# Patient Record
Sex: FEMALE | Race: WHITE | Employment: UNEMPLOYED | URBAN - METROPOLITAN AREA
[De-identification: names, ages, dates, MRNs, and addresses within clinical notes are randomized per-mention and may not be internally consistent; named-entity substitution may affect disease eponyms.]

---

## 2019-04-11 ENCOUNTER — HOSPITAL ENCOUNTER (INPATIENT)
Age: 29
LOS: 1 days | Discharge: HOME | DRG: 885 | End: 2019-04-12
Attending: EMERGENCY MEDICINE | Admitting: PSYCHIATRY & NEUROLOGY
Payer: SELF-PAY

## 2019-04-11 DIAGNOSIS — F32.2 SEVERE DEPRESSION (HCC): Primary | ICD-10-CM

## 2019-04-11 PROBLEM — F32.9 MAJOR DEPRESSIVE DISORDER: Status: ACTIVE | Noted: 2019-04-11

## 2019-04-11 LAB
ALBUMIN SERPL-MCNC: 3.4 G/DL (ref 3.5–5)
ALBUMIN/GLOB SERPL: 0.9 {RATIO} (ref 1.1–2.2)
ALP SERPL-CCNC: 107 U/L (ref 45–117)
ALT SERPL-CCNC: 23 U/L (ref 12–78)
AMPHET UR QL SCN: NEGATIVE
ANION GAP SERPL CALC-SCNC: 7 MMOL/L (ref 5–15)
APPEARANCE UR: ABNORMAL
AST SERPL-CCNC: 19 U/L (ref 15–37)
BACTERIA URNS QL MICRO: NEGATIVE /HPF
BARBITURATES UR QL SCN: NEGATIVE
BASOPHILS # BLD: 0.1 K/UL (ref 0–0.1)
BASOPHILS NFR BLD: 1 % (ref 0–1)
BENZODIAZ UR QL: NEGATIVE
BILIRUB SERPL-MCNC: 0.2 MG/DL (ref 0.2–1)
BILIRUB UR QL: NEGATIVE
BUN SERPL-MCNC: 12 MG/DL (ref 6–20)
BUN/CREAT SERPL: 13 (ref 12–20)
CALCIUM SERPL-MCNC: 8.8 MG/DL (ref 8.5–10.1)
CANNABINOIDS UR QL SCN: NEGATIVE
CHLORIDE SERPL-SCNC: 108 MMOL/L (ref 97–108)
CO2 SERPL-SCNC: 25 MMOL/L (ref 21–32)
COCAINE UR QL SCN: NEGATIVE
COLOR UR: ABNORMAL
CREAT SERPL-MCNC: 0.9 MG/DL (ref 0.55–1.02)
DIFFERENTIAL METHOD BLD: ABNORMAL
DRUG SCRN COMMENT,DRGCM: NORMAL
EOSINOPHIL # BLD: 0.5 K/UL (ref 0–0.4)
EOSINOPHIL NFR BLD: 5 % (ref 0–7)
EPITH CASTS URNS QL MICRO: ABNORMAL /LPF
ERYTHROCYTE [DISTWIDTH] IN BLOOD BY AUTOMATED COUNT: 13.1 % (ref 11.5–14.5)
ETHANOL SERPL-MCNC: <10 MG/DL
GLOBULIN SER CALC-MCNC: 3.8 G/DL (ref 2–4)
GLUCOSE SERPL-MCNC: 95 MG/DL (ref 65–100)
GLUCOSE UR STRIP.AUTO-MCNC: NEGATIVE MG/DL
HCG UR QL: NEGATIVE
HCT VFR BLD AUTO: 39.4 % (ref 35–47)
HGB BLD-MCNC: 13.4 G/DL (ref 11.5–16)
HGB UR QL STRIP: ABNORMAL
HYALINE CASTS URNS QL MICRO: ABNORMAL /LPF (ref 0–5)
IMM GRANULOCYTES # BLD AUTO: 0 K/UL (ref 0–0.04)
IMM GRANULOCYTES NFR BLD AUTO: 0 % (ref 0–0.5)
KETONES UR QL STRIP.AUTO: NEGATIVE MG/DL
LEUKOCYTE ESTERASE UR QL STRIP.AUTO: ABNORMAL
LYMPHOCYTES # BLD: 2.8 K/UL (ref 0.8–3.5)
LYMPHOCYTES NFR BLD: 28 % (ref 12–49)
MCH RBC QN AUTO: 30 PG (ref 26–34)
MCHC RBC AUTO-ENTMCNC: 34 G/DL (ref 30–36.5)
MCV RBC AUTO: 88.3 FL (ref 80–99)
METHADONE UR QL: NEGATIVE
MONOCYTES # BLD: 0.6 K/UL (ref 0–1)
MONOCYTES NFR BLD: 6 % (ref 5–13)
NEUTS SEG # BLD: 6.1 K/UL (ref 1.8–8)
NEUTS SEG NFR BLD: 60 % (ref 32–75)
NITRITE UR QL STRIP.AUTO: NEGATIVE
NRBC # BLD: 0 K/UL (ref 0–0.01)
NRBC BLD-RTO: 0 PER 100 WBC
OPIATES UR QL: NEGATIVE
PCP UR QL: NEGATIVE
PH UR STRIP: 6.5 [PH] (ref 5–8)
PLATELET # BLD AUTO: 214 K/UL (ref 150–400)
PMV BLD AUTO: 11.6 FL (ref 8.9–12.9)
POTASSIUM SERPL-SCNC: 4 MMOL/L (ref 3.5–5.1)
PROT SERPL-MCNC: 7.2 G/DL (ref 6.4–8.2)
PROT UR STRIP-MCNC: NEGATIVE MG/DL
RBC # BLD AUTO: 4.46 M/UL (ref 3.8–5.2)
RBC #/AREA URNS HPF: ABNORMAL /HPF (ref 0–5)
SODIUM SERPL-SCNC: 140 MMOL/L (ref 136–145)
SP GR UR REFRACTOMETRY: 1.03 (ref 1–1.03)
TSH SERPL DL<=0.05 MIU/L-ACNC: 1.54 UIU/ML (ref 0.36–3.74)
UROBILINOGEN UR QL STRIP.AUTO: 0.2 EU/DL (ref 0.2–1)
WBC # BLD AUTO: 10 K/UL (ref 3.6–11)
WBC URNS QL MICRO: ABNORMAL /HPF (ref 0–4)

## 2019-04-11 PROCEDURE — 81001 URINALYSIS AUTO W/SCOPE: CPT

## 2019-04-11 PROCEDURE — 36415 COLL VENOUS BLD VENIPUNCTURE: CPT

## 2019-04-11 PROCEDURE — 80053 COMPREHEN METABOLIC PANEL: CPT

## 2019-04-11 PROCEDURE — 99284 EMERGENCY DEPT VISIT MOD MDM: CPT

## 2019-04-11 PROCEDURE — 84443 ASSAY THYROID STIM HORMONE: CPT

## 2019-04-11 PROCEDURE — 81025 URINE PREGNANCY TEST: CPT

## 2019-04-11 PROCEDURE — 90791 PSYCH DIAGNOSTIC EVALUATION: CPT

## 2019-04-11 PROCEDURE — 65220000003 HC RM SEMIPRIVATE PSYCH

## 2019-04-11 PROCEDURE — 80307 DRUG TEST PRSMV CHEM ANLYZR: CPT

## 2019-04-11 PROCEDURE — 85025 COMPLETE CBC W/AUTO DIFF WBC: CPT

## 2019-04-11 RX ORDER — ACETAMINOPHEN 325 MG/1
650 TABLET ORAL
Status: DISCONTINUED | OUTPATIENT
Start: 2019-04-11 | End: 2019-04-12 | Stop reason: HOSPADM

## 2019-04-11 RX ORDER — OLANZAPINE 5 MG/1
5 TABLET ORAL
Status: DISCONTINUED | OUTPATIENT
Start: 2019-04-11 | End: 2019-04-12 | Stop reason: HOSPADM

## 2019-04-11 RX ORDER — ADHESIVE BANDAGE
30 BANDAGE TOPICAL DAILY PRN
Status: DISCONTINUED | OUTPATIENT
Start: 2019-04-11 | End: 2019-04-12 | Stop reason: HOSPADM

## 2019-04-11 RX ORDER — BENZTROPINE MESYLATE 2 MG/1
2 TABLET ORAL
Status: DISCONTINUED | OUTPATIENT
Start: 2019-04-11 | End: 2019-04-12 | Stop reason: HOSPADM

## 2019-04-11 RX ORDER — BENZTROPINE MESYLATE 1 MG/ML
2 INJECTION INTRAMUSCULAR; INTRAVENOUS
Status: DISCONTINUED | OUTPATIENT
Start: 2019-04-11 | End: 2019-04-12 | Stop reason: HOSPADM

## 2019-04-11 RX ORDER — TRAZODONE HYDROCHLORIDE 50 MG/1
50 TABLET ORAL
Status: DISCONTINUED | OUTPATIENT
Start: 2019-04-11 | End: 2019-04-12 | Stop reason: HOSPADM

## 2019-04-11 RX ORDER — IBUPROFEN 400 MG/1
400 TABLET ORAL
Status: DISCONTINUED | OUTPATIENT
Start: 2019-04-11 | End: 2019-04-12 | Stop reason: HOSPADM

## 2019-04-11 RX ORDER — BISMUTH SUBSALICYLATE 262 MG
1 TABLET,CHEWABLE ORAL DAILY
COMMUNITY
End: 2020-10-01

## 2019-04-11 RX ORDER — IBUPROFEN 200 MG
1 TABLET ORAL
Status: DISCONTINUED | OUTPATIENT
Start: 2019-04-11 | End: 2019-04-12 | Stop reason: HOSPADM

## 2019-04-11 RX ORDER — HYDROXYZINE 50 MG/1
50 TABLET, FILM COATED ORAL
Status: DISCONTINUED | OUTPATIENT
Start: 2019-04-11 | End: 2019-04-12 | Stop reason: HOSPADM

## 2019-04-11 NOTE — ROUTINE PROCESS
TRANSFER - OUT REPORT: 
 
Verbal report given to DOYLE Wang(name) on Rosa Tamez  being transferred to [facility](unit) for routine progression of care Report consisted of patients Situation, Background, Assessment and  
Recommendations(SBAR). Information from the following report(s) SBAR and Recent Results was reviewed with the receiving nurse. Opportunity for questions and clarification was provided. Patient transported with: 
RN Update: Patient ambulatory, VSS, medically cleared and in NAD at time of transfer to [facility].

## 2019-04-11 NOTE — ED TRIAGE NOTES
1543: Patient arrives for depression. Patient states that she has been feeling, \"tired lately, fatigue, and I cant sleep, Mery gained like 15 lbs over the past 1.5 months. \" Decrease in \"caring for home and taking care of the kids. \"  
 
 
Denies SI/HI. 1551: BSMART with patient in RRB.

## 2019-04-11 NOTE — ED PROVIDER NOTES
HPI Pt expresses sadness and feeling over whelmed; very teary; accompanied by her . She reports a headache, uncontrollable crying spells and recent unexplained weight gain. Denies fever, cough, cold symptoms, neck pain, visual changes, focal weakness or rash. Denies any  difficulty breathing, difficulty swallowing, SOB or chest pain. Denies any nausea, vomiting or diarrhea. Pt. Reports that she has not had any medications today prior to arrival. 
Pt has 3 children (ages 10, 3, and 2 ) with multiple medical issues. History reviewed. No pertinent past medical history. History reviewed. No pertinent surgical history. History reviewed. No pertinent family history. Social History Socioeconomic History  Marital status: Not on file Spouse name: Not on file  Number of children: Not on file  Years of education: Not on file  Highest education level: Not on file Occupational History  Not on file Social Needs  Financial resource strain: Not on file  Food insecurity:  
  Worry: Not on file Inability: Not on file  Transportation needs:  
  Medical: Not on file Non-medical: Not on file Tobacco Use  Smoking status: Never Smoker  Smokeless tobacco: Never Used Substance and Sexual Activity  Alcohol use: Never Frequency: Never  Drug use: Never  Sexual activity: Not on file Lifestyle  Physical activity:  
  Days per week: Not on file Minutes per session: Not on file  Stress: Not on file Relationships  Social connections:  
  Talks on phone: Not on file Gets together: Not on file Attends Zoroastrian service: Not on file Active member of club or organization: Not on file Attends meetings of clubs or organizations: Not on file Relationship status: Not on file  Intimate partner violence:  
  Fear of current or ex partner: Not on file Emotionally abused: Not on file Physically abused: Not on file Forced sexual activity: Not on file Other Topics Concern  Not on file Social History Narrative  Not on file ALLERGIES: Patient has no known allergies. Review of Systems Constitutional: Positive for activity change, appetite change and unexpected weight change. HENT: Negative for trouble swallowing. Respiratory: Negative for cough and shortness of breath. Cardiovascular: Negative for chest pain, palpitations and leg swelling. Gastrointestinal: Negative for abdominal pain, diarrhea, nausea and vomiting. Genitourinary: Negative for dysuria. Skin: Negative for rash. Neurological: Positive for headaches. Psychiatric/Behavioral: Positive for decreased concentration, dysphoric mood and sleep disturbance. The patient is nervous/anxious. All other systems reviewed and are negative. Vitals:  
 04/11/19 1508 04/11/19 1546 BP:  127/85 Pulse: 72 90 Resp:  15 SpO2: 100% 98% Physical Exam  
Constitutional:  
Obese white female; non smoker; ; mother of 3 HENT:  
Right Ear: External ear normal.  
Left Ear: External ear normal.  
Nose: Nose normal.  
Mouth/Throat: Oropharynx is clear and moist.  
Neck: Normal range of motion. Neck supple. Cardiovascular: Normal rate and regular rhythm. Pulmonary/Chest: Effort normal and breath sounds normal.  
Abdominal: Soft. Bowel sounds are normal.  
Musculoskeletal: Normal range of motion. Lymphadenopathy:  
  She has no cervical adenopathy. Neurological: She is alert. Skin: Skin is warm and dry. No rash noted. Psychiatric:  
Appears depressed, teary; tired Nursing note and vitals reviewed. MDM Procedures Bsmart consult and evaluation; Dr. Silverio Mahajan will admit. 5:55 PM 
Patient's results and plan of care have been reviewed with the pt and her .   Patient and/or family have verbally conveyed their understanding and agreement of the patient's signs, symptoms, diagnosis, treatment and prognosis and additionally agree to be admitted. Salma Hensley NP Discussed plan of care with Dr. Joshua Castro.  Salma Hensley NP

## 2019-04-11 NOTE — BSMART NOTE
Comprehensive Assessment Form Part 1 Section I - Disposition Axis I - Major Depression, Single Episode, Severe Axis II - Deffered Axis III - History reviewed. No pertinent past medical history. Axis IV - stressors with children [place] V - 45-50 The Medical Doctor to Psychiatrist conference was not completed. The Medical Doctor is in agreement with Psychiatrist disposition because of (reason) they agree with the dispotition. The plan is admission to the behavioral health unit. The on-call Psychiatrist consulted was NP Marina Hall. The admitting Psychiatrist will be Dr. Marcos Smith. The admitting Diagnosis is Major Depression. The Payor source is Self Pay. Section II - Integrated Summary Summary:  Writer met with this patient face to face at [facility] emergency department. She appeared very lethargic with swollen and slightly open eyes. She says that she is sleeping all day and all night and has gained 15 pounds in the last month and a has little to no energy to do the the things she knows that she needs to do. She denied suicidal and homicidal ideation. However, she does have that thoughts \"if I was the sick one, then things would get better\". Her spouse expressed concerns that this is highly unlike her and is very concerned about her lethargy. Writer called and spoke to on call nurse practitioner Marina Hall who instructed writer to admit this patient to the behavioral health unit citing concerns for psychomotor retardation. Writer discussed this with the patient and she is voluntary for admission. The patient has demonstrated mental capacity to provide informed consent. The information is given by the patient and spouse/SO. The Chief Complaint is depression. The Precipitant Factors are see axis IV. Previous Hospitalizations: none The patient has not previously been in restraints. Current Psychiatrist and/or  is none. Lethality Assessment: The potential for suicide noted by the following: severe depression symptoms. The potential for homicide is not noted. The patient has not been a perpetrator of sexual or physical abuse. There are not pending charges. The patient is not felt to be at risk for self harm or harm to others. The attending nurse was advised that security has not been notified. Section III - Psychosocial 
The patient's overall mood and attitude is depressed. Feelings of helplessness and hopelessness are not observed. Generalized anxiety is not observed. Panic is not observed. Phobias are not observed. Obsessive compulsive tendencies are not observed. Section IV - Mental Status Exam 
The patient's appearance shows no evidence of impairment. The patient's behavior is guarded, shows retardation and shows poor eye contact. The patient is oriented to time, place, person and situation. The patient's speech is slowed. The patient's mood is depressed, is withdrawn and displays anhedonia. The range of affect is flat. The patient's thought content demonstrates no evidence of impairment. The thought process is blocking. The patient's perception shows no evidence of impairment. The patient's memory shows no evidence of impairment. The patient's appetite is increased and shows signs of weight gain. The patient's sleep has evidence of hypersomnia. The patient shows no insight. The patient's judgement is psychologically impaired. Section V - Substance Abuse The patient is not using substances. Section VI - Living Arrangements The patient is . The patient lives with a spouse and with a child/children. The patient has 3 children ages 10, 3, and 2. The patient does plan to return home upon discharge. The patient does not have legal issues pending. The patient's source of income comes from family. Pentecostal and cultural practices have been noted and include: a belief in chrisitianity. The patient's greatest support comes from her  and this person will be involved with the treatment. The patient has not been in an event described as horrible or outside the realm of ordinary life experience either currently or in the past. 
The patient has not been a victim of sexual/physical abuse. Section VII - Other Areas of Clinical Concern The highest grade achieved is not assessed with the overall quality of school experience being described as not assessed. The patient is currently unemployed and speaks [place] as a primary language. The patient has no communication impairments affecting communication. The patient's preference for learning can be described as: can read and write adequately. The patient's hearing is normal.  The patient's vision is impaired and  wears glasses or contacts. ARLYN Luevano, Supervisee in Social Work

## 2019-04-11 NOTE — BH NOTES
TRANSFER - IN REPORT: 
8415 Verbal report received from [facility] (name) on Anibal Meyers  being received from ER [facility](unit) for routine progression of care Report consisted of patients Situation, Background, Assessment and  
Recommendations(SBAR). Information from the following report(s) SBAR was reviewed with the receiving nurse. Opportunity for questions and clarification was provided. Assessment completed upon patients arrival to unit and care assumed. Arrived at 56 Primary Nurse Beckie Hussein RN and Josh King RN performed a dual skin assessment on this patient No impairment noted Chaparro score is 23 Patient tearful on arrival to unit. Patient oriented to unit. Patient re-assured. Patient visited with . Cooperative. Stating depressed lately. 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 Denies SI. Contracts for safety.

## 2019-04-11 NOTE — ED NOTES
Patient resting on stretcher. Calm and cooperative,  at bedside. No signs of distress. Patient updated on plan of care. She denies needs and concerns at this time. Labs and urine sent. POC pregnancy test negative.

## 2019-04-12 VITALS
TEMPERATURE: 98.1 F | SYSTOLIC BLOOD PRESSURE: 114 MMHG | RESPIRATION RATE: 18 BRPM | OXYGEN SATURATION: 99 % | WEIGHT: 230 LBS | HEIGHT: 63 IN | HEART RATE: 78 BPM | BODY MASS INDEX: 40.75 KG/M2 | DIASTOLIC BLOOD PRESSURE: 72 MMHG

## 2019-04-12 PROCEDURE — 74011250637 HC RX REV CODE- 250/637: Performed by: NURSE PRACTITIONER

## 2019-04-12 RX ORDER — BUPROPION HYDROCHLORIDE 100 MG/1
100 TABLET, EXTENDED RELEASE ORAL 2 TIMES DAILY
Qty: 30 TAB | Refills: 0 | Status: SHIPPED | OUTPATIENT
Start: 2019-04-12 | End: 2020-10-01

## 2019-04-12 RX ORDER — THERA TABS 400 MCG
1 TAB ORAL DAILY
Status: DISCONTINUED | OUTPATIENT
Start: 2019-04-13 | End: 2019-04-12 | Stop reason: HOSPADM

## 2019-04-12 RX ORDER — TRAZODONE HYDROCHLORIDE 50 MG/1
50 TABLET ORAL
Qty: 30 TAB | Refills: 0 | Status: SHIPPED | OUTPATIENT
Start: 2019-04-12 | End: 2020-10-01

## 2019-04-12 RX ORDER — BUPROPION HYDROCHLORIDE 100 MG/1
100 TABLET, EXTENDED RELEASE ORAL 2 TIMES DAILY
Status: DISCONTINUED | OUTPATIENT
Start: 2019-04-12 | End: 2019-04-12 | Stop reason: HOSPADM

## 2019-04-12 RX ADMIN — TRAZODONE HYDROCHLORIDE 50 MG: 50 TABLET ORAL at 00:07

## 2019-04-12 NOTE — PROGRESS NOTES
Problem: Discharge Planning Goal: *Discharge to safe environment Outcome: Progressing Towards Goal 
Note:  
Patient identifies home as a safe environment. Patient will return home upon discharge. Goal: *Knowledge of medication management Outcome: Progressing Towards Goal 
Note:  
Patient verbalizes understanding of medication regimen. Patient agrees to take all medications as prescribed. Goal: *Knowledge of discharge instructions Outcome: Progressing Towards Goal 
Note:  
Patient verbalizes understanding of goals for treatment and safe discharge.

## 2019-04-12 NOTE — PROGRESS NOTES
100 [facility] 60 Master Treatment Plan for [employer] Date Treatment Plan Initiated: 4/12/19 Treatment Plan Modalities: 
Type of Modality Amount (x minutes) Frequency (x/week) Duration (x days) Name of Responsible Staff Community & wrap-up meetings to encourage peer interactions 10[facility] psychotherapy to assist in building coping skills and internal controls 60 7 [address] Therapeutic activity groups to build coping skills 60 7 1 Wicho Martin Psychoeducation in group setting to address:  
Medication education [facility] Coping skills Relaxation techniques Symptom management Discharge planning [facility] Spirituality 2[address] [employer] 60 1 1 volunteer Recovery/AA/NA 
    volunteer Physician medication management 15 7 1 DR. Parra Family meeting/discharge planning [facility] Michaela and Joanie Goodman THESE GOALS WILL BE MET BY 4/12/19 Problem: Depressed Mood (Adult/Pediatric) Goal: *STG: Participates in treatment plan Outcome: Progressing Towards Goal 
Note: Out on unit smiling when engaged. Affect sad, mood euthymic. Daily goal is to start an antidepressant and d/c home. Goal: *STG: Verbalizes anger, guilt, and other feelings in a constructive manor Outcome: Progressing Towards Goal 
Note: Overwhelmed with taking care of her children and managing their home. Reports increase anxiety since admission due to being away from children and . Requests to work on therapy and with a provider as outpt. Goal: *STG: Attends activities and groups Outcome: Progressing Towards Goal 
Goal: *STG: Demonstrates reduction in symptoms and increase in insight into coping skills/future focused Outcome: Progressing Towards Goal 
Goal: *STG: Remains safe in hospital 
Outcome: Progressing Towards Goal 
Goal: *STG: Complies with medication therapy Outcome: Progressing Towards Goal 
Goal: Interventions Outcome: Progressing Towards Goal

## 2019-04-12 NOTE — INTERDISCIPLINARY ROUNDS
Behavioral Health Interdisciplinary Rounds Patient Name: Lavinia Lim  Age: 34 y.o. Room/Bed:  748/ Primary Diagnosis: <principal problem not specified> Admission Status: Voluntary Readmission within 30 days: no 
Power of  in place: no 
Patient requires a blocked bed: no          Reason for blocked bed: VTE Prophylaxis: No 
 
Mobility needs/Fall risk: no 
Flu Vaccine : no  
Nutritional Plan: no 
Consults:         
Labs/Testing due today?: no 
 
Sleep hours:  5.5 Participation in Care/Groups:  New admit Medication Compliant?: No scheduled meds PRNS (last 24 hours): Sleep Aid Restraints (last 24 hours):  no 
  
CIWA (range last 24 hours): COWS (range last 24 hours): Alcohol screening (AUDIT) completed -   AUDIT Score: 4 If applicable, date SBIRT discussed in treatment team AND documented:  
AUDIT Screen Score: AUDIT Score: 4 Tobacco - patient is a smoker: Have You Used Tobacco in the Past 30 Days: Never a smoker Illegal Drugs use: Have You Used Any Illegal Substances Over the Past 12 Months: No 
 
24 hour chart check complete: yes Patient goal(s) for today: meet with treatment team 
Treatment team focus/goals: psychosocial and care plan Progress note: Pt reports increased depression over the past 2 years LOS:  1  Expected LOS: TBD Financial concerns/prescription coverage: Uninsured Date of last family contact: None Family requesting physician contact today: No 
Discharge plan: Return home Guns in the home: No     
Outpatient provider(s): To be linked Participating treatment team members: Lavinia CarinaRoque MSW; Dr. Isabella Paulson MD; Jefferson Krause RN; Saran Tsai, LissettD

## 2019-04-12 NOTE — PROGRESS NOTES
Resting in bed with eyes closed, no complaints, no distress noted. Safety measures in place, will continue to monitor. PRN Medication Documentation Specific patient behavior that led to need for PRN medication: sleeplessness Staff interventions attempted prior to PRN being given: none PRN medication given: Trazodone Patient response/effectiveness of PRN medication: will continue to monitor

## 2019-04-12 NOTE — BH NOTES
Discharge instructions given to patient Pt signed electronically  
opportunity for questions given Prescriptions given. No personal belongings bought in by patient Pt is waiting for her ride at 1300.

## 2019-04-12 NOTE — DISCHARGE INSTRUCTIONS
DISCHARGE SUMMARY    NAME:Tierra Cisneros  : 1990  MRN: [id number]    The patient Lavinia Lim exhibits the ability to control behavior in a less restrictive environment. Patient's level of functioning is improving. No assaultive/destructive behavior has been observed for the past 24 hours. No suicidal/homicidal threat or behavior has been observed for the past 24 hours. There is no evidence of serious medication side effects. Patient has not been in physical or protective restraints for at least the past 24 hours. If weapons involved, how are they secured? No weapons involved. Is patient aware of and in agreement with discharge plan? Yes    Arrangements for medication:  Prescriptions given to patient. Copy of discharge instructions to provider?:  [phone] ([phone])    Arrangements for transportation home:  Family to     Keep all follow up appointments as scheduled, continue to take prescribed medications per physician instructions. Mental health crisis number:  292 or your local mental health crisis line number at [phone]. DISCHARGE SUMMARY from Nurse    PATIENT INSTRUCTIONS:    What to do at Home:  Recommended activity: Activity as tolerated,     If you experience any of the following symptoms thoughts of harming self, feeling overwhelmed with hopelessness, intense anxiety, please follow up with your local crisis number at [phone]. Once you have an established relationship with your assigned providers and therapist incorporate them into your support system and crisis plan. .    *  Please give a list of your current medications to your Primary Care Provider. *  Please update this list whenever your medications are discontinued, doses are      changed, or new medications (including over-the-counter products) are added. *  Please carry medication information at all times in case of emergency situations.     These are general instructions for a healthy lifestyle:    No smoking/ No tobacco products/ Avoid exposure to second hand smoke  Surgeon General's Warning:  Quitting smoking now greatly reduces serious risk to your health. Obesity, smoking, and sedentary lifestyle greatly increases your risk for illness    A healthy diet, regular physical exercise & weight monitoring are important for maintaining a healthy lifestyle    You may be retaining fluid if you have a history of heart failure or if you experience any of the following symptoms:  Weight gain of 3 pounds or more overnight or 5 pounds in a week, increased swelling in our hands or feet or shortness of breath while lying flat in bed. Please call your doctor as soon as you notice any of these symptoms; do not wait until your next office visit. Recognize signs and symptoms of STROKE:    F-face looks uneven    A-arms unable to move or move unevenly    S-speech slurred or non-existent    T-time-call 911 as soon as signs and symptoms begin-DO NOT go       Back to bed or wait to see if you get better-TIME IS BRAIN. Warning Signs of HEART ATTACK     Call 911 if you have these symptoms:   Chest discomfort. Most heart attacks involve discomfort in the center of the chest that lasts more than a few minutes, or that goes away and comes back. It can feel like uncomfortable pressure, squeezing, fullness, or pain.  Discomfort in other areas of the upper body. Symptoms can include pain or discomfort in one or both arms, the back, neck, jaw, or stomach.  Shortness of breath with or without chest discomfort.  Other signs may include breaking out in a cold sweat, nausea, or lightheadedness. Don't wait more than five minutes to call 911 - MINUTES MATTER! Fast action can save your life. Calling 911 is almost always the fastest way to get lifesaving treatment. Emergency Medical Services staff can begin treatment when they arrive -- up to an hour sooner than if someone gets to the hospital by car.      The discharge information has been reviewed with the patient. The patient verbalized understanding. Discharge medications reviewed with the patient and appropriate educational materials and side effects teaching were provided.   ___________________________________________________________________________________________________________________________________

## 2019-04-12 NOTE — BH NOTES
PSYCHOSOCIAL ASSESSMENT 
:Patient identifying info: 
Veto Torres is a 34 y.o., female admitted 4/11/2019  3:33 PM  
 
Presenting problem and precipitating factors: Patient came to [facility] c/o depression and feeling overwhelmed. Pt reports symptoms of lethargy, hypersomnia, and weight gain. 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 Pt is receptive to receiving outpatient care through the B. Mental status assessment: Alert, oriented, calm, cooperative Collateral information: Radha Veloz ( [phone]) Current psychiatric /substance abuse providers and contact info: Jackie Iniguez (therapist at [facility]) Previous psychiatric/substance abuse providers and response to treatment: None Family history of mental illness or substance abuse: Denies Substance abuse history:  None Social History Tobacco Use  Smoking status: Never Smoker  Smokeless tobacco: Never Used Substance Use Topics  Alcohol use: Never Frequency: Never History of biomedical complications associated with substance abuse:  N/A Patient's current acceptance of treatment or motivation for change: Good Family constellation: , 3 children (ages 10, 3, and 2) Is significant other involved? Yes,  Describe support system:  Describe living arrangements and home environment: Patient lives with her  and children. Health issues:  
Hospital Problems  Never Reviewed Codes Class Noted POA Major depressive disorder ICD-10-CM: F32.9 ICD-9-CM: 296.20  4/11/2019 Unknown Trauma history: Children have had some medical issues in the past few months Legal issues: None indicated History of  service: No 
 
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 
 
Leisure and recreation preferences: Spending time with family Describe coping skills: Limited Yanira Toledo 2019

## 2019-04-12 NOTE — DISCHARGE SUMMARY
The following notes are from the initial Psychiatric interview    CHIEF COMPLAINT:  \"I feel a little bit better today. \"     HISTORY OF PRESENT ILLNESS:  The patient is a 16-year-old  female who is currently admitted to the inpatient psychiatric unit at [facility].  She presented to the ER yesterday requesting help for depression and loss of ability to function. The patient reports that she has been depressed for the past 2 years, and over the past 2 months, her depression has been worsening gradually. It appears that her symptoms started postpartum after the birth of her third child. Reports that she has been getting to the point that she struggles to function in her day to day life. However, admits that she is able to take care of her children and attend to their needs. States that she has days where she just sits on the couch and does not do much with the children. This makes her feel guilty. Notes decreased motivation, decreased sleep, trouble with focus, and feels she cannot help her  with his responsibilities. Denies any suicidal ideation or plan. Denies any perceptual abnormalities. She is requesting to be discharged as her  struggled with taking care of her children. Two of them have been medically ill recently including her son, who spent several weeks in the Burns Unit at [facility] after stepping into the fire pit in their backyard. Her daughter was diagnosed with an autoimmune disorder, which has also been a struggle.     PAST MEDICAL HISTORY:  Reviewed as per the H and P.     History reviewed. No pertinent past medical history. Prior to Admission medications    Medication Sig Start Date End Date Taking? Authorizing Provider   buPROPion SR [facility] SR) 100 mg SR tablet Take 1 Tab by mouth two (2) times a day.  Indications: major depressive disorder 4/12/19   Yes Thompson Berman MD   traZODone (DESYREL) 50 mg tablet Take 1 Tab by mouth nightly as needed for Sleep. Indications: insomnia associated with depression 4/12/19   Yes Hugo Solis MD   multivitamin (ONE A DAY) tablet Take 1 Tab by mouth daily.     Yes Provider, Historical             Vitals:     04/11/19 1906 04/11/19 1949 04/12/19 0715 04/12/19 1152   BP: 118/78   107/73 114/72   Pulse: 76   90 78   Resp: 16   16 18   Temp: 98.4 °F (36.9 °C)   98 °F (36.7 °C) 98.1 °F (36.7 °C)   SpO2: 100%   98% 99%   Weight:   104.3 kg (230 lb)       Height:   5' 3\" (1.6 m)                Lab Results   Component Value Date/Time     WBC 10.0 04/11/2019 05:17 PM     HGB 13.4 04/11/2019 05:17 PM     HCT 39.4 04/11/2019 05:17 PM     PLATELET 726 95/89/9185 05:17 PM     MCV 88.3 04/11/2019 05:17 PM            Lab Results   Component Value Date/Time     Sodium 140 04/11/2019 05:17 PM     Potassium 4.0 04/11/2019 05:17 PM     Chloride 108 04/11/2019 05:17 PM     CO2 25 04/11/2019 05:17 PM     Anion gap 7 04/11/2019 05:17 PM     Glucose 95 04/11/2019 05:17 PM     BUN 12 04/11/2019 05:17 PM     Creatinine 0.90 04/11/2019 05:17 PM     BUN/Creatinine ratio 13 04/11/2019 05:17 PM     GFR est AA >60 04/11/2019 05:17 PM     GFR est non-AA >60 04/11/2019 05:17 PM     Calcium 8.8 04/11/2019 05:17 PM     Bilirubin, total 0.2 04/11/2019 05:17 PM     AST (SGOT) 19 04/11/2019 05:17 PM     Alk. phosphatase 107 04/11/2019 05:17 PM     Protein, total 7.2 04/11/2019 05:17 PM     Albumin 3.4 (L) 04/11/2019 05:17 PM     Globulin 3.8 04/11/2019 05:17 PM     A-G Ratio 0.9 (L) 04/11/2019 05:17 PM     ALT (SGPT) 23 04/11/2019 05:17 PM         PAST PSYCHIATRIC HISTORY:  The patient denies any prior history of psychiatric treatment, inpatient hospitalizations or suicide attempts. She denies any prior history of substance abuse. Drinks one to two drinks a month and denies use of any recreational substances.     PSYCHOSOCIAL HISTORY:  The patient currently lives in [place] with her . They have been  for almost 7 years.   She has three children including a 10year-old, a 3year-old, and a 3year-old. 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 Denies any major legal or financial stressors.     MENTAL STATUS EXAM:  The patient is a young  female who is dressed in casual street clothes. She is calm and cooperative. Makes good eye contact. Her affect is reactive and mood is reported as being better. Speech is spontaneous and coherent. Denies any active suicidal ideation or plan. Denies any perceptual abnormalities. Denies any delusions. Her thought process is logical and goal-directed. Cognitively, she is awake and alert, oriented to time, place, and person. Intelligence is average. Memory is intact and fund of knowledge is adequate. Insight is partial.  Judgment is fair.     ASSESSMENT AND PLAN/DIAGNOSIS:  Major depressive disorder, moderate without psychotic symptoms. COURSE IN THE HOSPITAL:    Patient was admitted to the inpatient psychiatry unit and placed on Q15 minute checks. She was started back on her usual medication regimen as well as PRN medications including  Wellbutrin and Trazodone for sleep. She improved gradually and was able to integrate into the milieu. Patients symptoms improved gradually including her depressed mood and inability to function. She was quite on the unit, appropriate in her interactions, and cooperative with medications and the unit routine. She requested to be discharged the same day as my evaluation as she feels markedly better. Patient was discharged as per the plan. She had been doing well on the unit as per the report of the nursing staff and my observations. No PRN medication for agitation, seclusion or restraints were required during the last 24 hours of her stay. Se had improved progressively to the point of being stable for discharge and outpatient FU. At this time she did not offer any complaints. Patient denied any SI or HI. Denied any AH or VH.  Se denied any delusions. Was not considered a danger to self or to others and is safe for discharge. Will FU with her appointments and remains motivated to be in treatment. The patient verbalized understanding of her discharge instructions. DISCHARGE DIAGNOSIS:  Major Depression, moderate without psychotic symptoms. MENTAL STATUS EXAM ON DISCHARGE:    General appearance:   Joe Leung is a 34 y.o. WHITE OR  female who is well groomed, psychomotor activity is WNL  Eye contact: makes good eye contact  Speech: Spontaneous and coherent  Affect : Euthymic  Mood: \"OK\"  Thought Process: Logical, goal directed  Perception: Denies any AH or VH. Thought Content: Denies any SI or Plan  Insight: Partial  Judgement: Fair  Cognition: Intact grossly. Current Discharge Medication List      START taking these medications    Details   buPROPion SR (WELLBUTRIN SR) 100 mg SR tablet Take 1 Tab by mouth two (2) times a day. Indications: major depressive disorder  Qty: 30 Tab, Refills: 0      traZODone (DESYREL) 50 mg tablet Take 1 Tab by mouth nightly as needed for Sleep. Indications: insomnia associated with depression  Qty: 30 Tab, Refills: 0         CONTINUE these medications which have NOT CHANGED    Details   multivitamin (ONE A DAY) tablet Take 1 Tab by mouth daily.

## 2019-04-12 NOTE — BH NOTES
Behavioral Health Transition Record to Provider Patient Name: Minoo Flores YOB: 1990 Medical Record Number: [id number] Date of Admission: 4/11/2019 Date of Discharge: 4/12/2019 Attending Provider: Aiden Regalado MD 
Discharging Provider: Aiden Regalado MD 
To contact this individual call [phone] and ask the  to page. If unavailable, ask to be transferred to [facility] Provider on call. [facility] Provider will be available on call 24/7 and during holidays. Primary Care Provider: None No Known Allergies Reason for Admission: The patient is a 37-year-old  female who is currently admitted to the inpatient psychiatric unit at [address]  She presented to the ER yesterday requesting help for depression and loss of ability to function. Admission Diagnosis: Major depressive disorder [F32.9] * No surgery found * Results for orders placed or performed during the hospital encounter of 04/11/19 CBC WITH AUTOMATED DIFF Result Value Ref Range WBC 10.0 3.6 - 11.0 K/uL  
 RBC 4.46 3.80 - 5.20 M/uL  
 HGB 13.4 11.5 - 16.0 g/dL HCT 39.4 35.0 - 47.0 % MCV 88.3 80.0 - 99.0 FL  
 MCH 30.0 26.0 - 34.0 PG  
 MCHC 34.0 30.0 - 36.5 g/dL  
 RDW 13.1 11.5 - 14.5 % PLATELET 672 386 - 150 K/uL MPV 11.6 8.9 - 12.9 FL  
 NRBC 0.0 0  WBC ABSOLUTE NRBC 0.00 0.00 - 0.01 K/uL NEUTROPHILS 60 32 - 75 % LYMPHOCYTES 28 12 - 49 % MONOCYTES 6 5 - 13 % EOSINOPHILS 5 0 - 7 % BASOPHILS 1 0 - 1 % IMMATURE GRANULOCYTES 0 0.0 - 0.5 % ABS. NEUTROPHILS 6.1 1.8 - 8.0 K/UL  
 ABS. LYMPHOCYTES 2.8 0.8 - 3.5 K/UL  
 ABS. MONOCYTES 0.6 0.0 - 1.0 K/UL  
 ABS. EOSINOPHILS 0.5 (H) 0.0 - 0.4 K/UL  
 ABS. BASOPHILS 0.1 0.0 - 0.1 K/UL  
 ABS. IMM. GRANS. 0.0 0.00 - 0.04 K/UL  
 DF AUTOMATED METABOLIC PANEL, COMPREHENSIVE Result Value Ref Range Sodium 140 136 - 145 mmol/L  Potassium 4.0 3.5 - 5.1 mmol/L  
 Chloride 108 97 - 108 mmol/L  
 CO2 25 21 - 32 mmol/L Anion gap 7 5 - 15 mmol/L Glucose 95 65 - 100 mg/dL BUN 12 6 - 20 MG/DL Creatinine 0.90 0.55 - 1.02 MG/DL  
 BUN/Creatinine ratio 13 12 - 20 GFR est AA >60 >60 ml/min/1.73m2 GFR est non-AA >60 >60 ml/min/1.73m2 Calcium 8.8 8.5 - 10.1 MG/DL Bilirubin, total 0.2 0.2 - 1.0 MG/DL  
 ALT (SGPT) 23 12 - 78 U/L  
 AST (SGOT) 19 15 - 37 U/L Alk. phosphatase 107 45 - 117 U/L Protein, total 7.2 6.4 - 8.2 g/dL Albumin 3.4 (L) 3.5 - 5.0 g/dL Globulin 3.8 2.0 - 4.0 g/dL A-G Ratio 0.9 (L) 1.1 - 2.2 URINALYSIS W/ RFLX MICROSCOPIC Result Value Ref Range Color YELLOW/STRAW Appearance CLOUDY (A) CLEAR Specific gravity 1.026 1.003 - 1.030    
 pH (UA) 6.5 5.0 - 8.0 Protein NEGATIVE  NEG mg/dL Glucose NEGATIVE  NEG mg/dL Ketone NEGATIVE  NEG mg/dL Bilirubin NEGATIVE  NEG Blood TRACE (A) NEG Urobilinogen 0.2 0.2 - 1.0 EU/dL Nitrites NEGATIVE  NEG Leukocyte Esterase SMALL (A) NEG    
 WBC 5-10 0 - 4 /hpf  
 RBC 0-5 0 - 5 /hpf Epithelial cells MODERATE (A) FEW /lpf Bacteria NEGATIVE  NEG /hpf Hyaline cast 0-2 0 - 5 /lpf DRUG SCREEN, URINE Result Value Ref Range AMPHETAMINES NEGATIVE  NEG    
 BARBITURATES NEGATIVE  NEG BENZODIAZEPINES NEGATIVE  NEG    
 COCAINE NEGATIVE  NEG METHADONE NEGATIVE  NEG    
 OPIATES NEGATIVE  NEG    
 PCP(PHENCYCLIDINE) NEGATIVE  NEG    
 THC (TH-CANNABINOL) NEGATIVE  NEG Drug screen comment (NOTE) ETHYL ALCOHOL Result Value Ref Range ALCOHOL(ETHYL),SERUM <10 <10 MG/DL  
TSH 3RD GENERATION Result Value Ref Range TSH 1.54 0.36 - 3.74 uIU/mL  
HCG URINE, QL. - POC Result Value Ref Range Pregnancy test,urine (POC) NEGATIVE  NEG Immunizations administered during this encounter: There is no immunization history on file for this patient.  
 
Screening for Metabolic Disorders for Patients on Antipsychotic Medications 
(Data obtained from the EMR) Estimated Body Mass Index Estimated body mass index is 40.74 kg/m² as calculated from the following: 
  Height as of this encounter: 5' 3\" (1.6 m). Weight as of this encounter: 104.3 kg (230 lb). Vital Signs/Blood Pressure Visit Vitals /72 (BP Patient Position: Sitting) Pulse 78 Temp 98.1 °F (36.7 °C) Resp 18 Ht 5' 3\" (1.6 m) Wt 104.3 kg (230 lb) SpO2 99% Breastfeeding? No  
BMI 40.74 kg/m² Blood Glucose/Hemoglobin A1c Lab Results Component Value Date/Time Glucose 95 2019 05:17 PM  
 
No results found for: HBA1C, HGBE8, [id number] Lipid Panel No results found for: CHOL, CHOLX, CHLST, [address], [id number], HDL, LDL, LDLC, DLDLP, TGLX, TRIGL, TRIGP, CHHD, CHHDX Discharge Diagnosis: Major depressive disorder (ICD-10-CM: F32.9) Discharge Plan: Patient discharged home into the care of family. [address]en [address] : 1990 MRN: [id number] The patient Anibal Meyers exhibits the ability to control behavior in a less restrictive environment. Patient's level of functioning is improving. No assaultive/destructive behavior has been observed for the past 24 hours. No suicidal/homicidal threat or behavior has been observed for the past 24 hours. There is no evidence of serious medication side effects. Patient has not been in physical or protective restraints for at least the past 24 hours. If weapons involved, how are they secured? No weapons involved. Is patient aware of and in agreement with discharge plan? Yes Arrangements for medication:  Prescriptions given to patient. Copy of discharge instructions to provider?:  [phone] ([phone]) Arrangements for transportation home:  Family to  Keep all follow up appointments as scheduled, continue to take prescribed medications per physician instructions. Mental health crisis number:  609 or your local mental health crisis line number at [phone]. Discharge Medication List and Instructions:  
Discharge Medication List as of 4/12/2019 12:04 PM  
  
START taking these medications Details buPROPion SR (WELLBUTRIN SR) 100 mg SR tablet Take 1 Tab by mouth two (2) times a day. Indications: major depressive disorder, Print, Disp-30 Tab, R-0  
  
traZODone (DESYREL) 50 mg tablet Take 1 Tab by mouth nightly as needed for Sleep. Indications: insomnia associated with depression, Print, Disp-30 Tab, R-0  
  
  
CONTINUE these medications which have NOT CHANGED Details  
multivitamin (ONE A DAY) tablet Take 1 Tab by mouth daily. , Historical Med  
  
  
 
 
Unresulted Labs (24h ago, onward) None To obtain results of studies pending at discharge, please contact [phone] Follow-up Information Follow up With Specialties Details Why Contact Info Young Shingles  On 4/15/2019 You have a 2:30pm hospital discharge follow-up appointment. Remember to bring your photo ID. [address] [facility], [address] [facility] 
[phone] Advanced Directive:  
Does the patient have an appointed surrogate decision maker? No 
Does the patient have a Medical Advance Directive? No 
Does the patient have a Psychiatric Advance Directive? No 
If the patient does not have a surrogate or Medical Advance Directive AND Psychiatric Advance Directive, the patient was offered information on these advance directives Yes and Patient declined to complete Patient Instructions: Please continue all medications until otherwise directed by physician. Tobacco Cessation Discharge Plan:  
Is the patient a smoker and needs referral for smoking cessation? No 
Patient referred to the following for smoking cessation with an appointment? Not applicable Patient was offered medication to assist with smoking cessation at discharge? Not applicable Was education for smoking cessation added to the discharge instructions? Yes Alcohol/Substance Abuse Discharge Plan:  
Does the patient have a history of substance/alcohol abuse and requires a referral for treatment? No 
Patient referred to the following for substance/alcohol abuse treatment with an appointment? Not applicable Patient was offered medication to assist with alcohol cessation at discharge? Not applicable Was education for substance/alcohol abuse added to discharge instructions? No 
 
Patient discharged to Home; discussed with patient/caregiver and provided to the patient/caregiver either in hard copy or electronically.

## 2019-04-12 NOTE — BH NOTES
GROUP THERAPY PROGRESS NOTE Veto Torres participated in the General Unit's Process Group, with a focus identifying feelings and planning for the day. Group time: 60 minutes. Personal goal for participation: To increase the capacity to improve ones mood and structure. Goal orientation: The patient will be able to identify their feelings, develop a plan for structuring their day, and discharge planning. Group therapy participation: With prompting, this patient actively participated in the group. Therapeutic interventions reviewed and discussed: The group members were asked to introduce themselves to each other and to see if they could identify an emotion they are having and/or let the group know what they want to focus on for the day as they continue to make discharge plans. Impression of participation: The patient said she was feeling, \"Tired. .. pretty good. \" She added that she has had a difficult time keeping up with her home responsibilities because of lowered energy. She reported having a [de-identified] year-old, three year-old, and an almost 3year-old child at home. \"I feel tired most of the time and do not have the motivation I think I should have. \" She said she has her  and other social supports to help her with child rearing. She was alert, generally oriented, and engaged. She expressed no current SI/HI and displayed no overt psychotic symptoms. Her affect was depressed and anxious. Her mood matched her affect. This was the patient's first process group with the undersigned.

## 2019-04-12 NOTE — PROGRESS NOTES
Pharmacist Discharge Medication Reconciliation Discharging Provider: Dr. Estrellita Aleman Significant PMH: History reviewed. No pertinent past medical history. Chief Complaint for this Admission: Chief Complaint Patient presents with Depression Allergies: Patient has no known allergies. Discharge Medications:  
Current Discharge Medication List  
  
 
START taking these medications Details buPROPion SR (WELLBUTRIN SR) 100 mg SR tablet Take 1 Tab by mouth two (2) times a day. Indications: major depressive disorder 
Qty: 30 Tab, Refills: 0  
  
traZODone (DESYREL) 50 mg tablet Take 1 Tab by mouth nightly as needed for Sleep. Indications: insomnia associated with depression 
Qty: 30 Tab, Refills: 0 CONTINUE these medications which have NOT CHANGED Details  
multivitamin (ONE A DAY) tablet Take 1 Tab by mouth daily.   
  
  
 
The patient's chart, MAR and AVS were reviewed by KATHLEEN MedinaD.

## 2019-04-12 NOTE — H&P
[address] [facility] PSYCH HISTORY AND PHYSICAL Name:  Lauren Almendarez 
MR#:  [id number] :  1990 ACCOUNT #:  [de-identified] ADMIT DATE:  2019 CHIEF COMPLAINT:  \"I feel a little bit better today. \" HISTORY OF PRESENT ILLNESS:  The patient is a 27-year-old  female who is currently admitted to the inpatient psychiatric unit at [address] [facility] [address].  She presented to the ER yesterday requesting help for depression and loss of ability to function. The patient reports that she has been depressed for the past 2 years, and over the past 2 months, her depression has been worsening gradually. It appears that her symptoms started postpartum after the birth of her third child. Reports that she has been getting to the point that she struggles to function in her day to day life. However, admits that she is able to take care of her children and attend to their needs. States that she has days where she just sits on the couch and does not do much with the children. This makes her feel guilty. Notes decreased motivation, decreased sleep, trouble with focus, and feels she cannot help her  with his responsibilities. Denies any suicidal ideation or plan. Denies any perceptual abnormalities. She is requesting to be discharged as her  struggled with taking care of her children. Two of them have been medically ill recently including her son, who spent several weeks in the Burns Unit at [facility] after stepping into the fire pit in their backyard. Her daughter was diagnosed with an autoimmune disorder, which has also been a struggle. PAST MEDICAL HISTORY:  Reviewed as per the H and P. History reviewed. No pertinent past medical history. Prior to Admission medications Medication Sig Start Date End Date Taking? Authorizing Provider buPROPion SR (WELLBUTRIN SR) 100 mg SR tablet Take 1 Tab by mouth two (2) times a day. Indications: major depressive disorder 4/12/19  Yes Segundo Coulter MD  
traZODone (DESYREL) 50 mg tablet Take 1 Tab by mouth nightly as needed for Sleep. Indications: insomnia associated with depression 4/12/19  Yes Segundo Coulter MD  
multivitamin (ONE A DAY) tablet Take 1 Tab by mouth daily. Yes Provider, Historical  
  
Vitals:  
 04/11/19 1906 04/11/19 1949 04/12/19 0715 04/12/19 1152 BP: 118/78  107/73 114/72 Pulse: 76  90 78 Resp: 16  16 18 Temp: 98.4 °F (36.9 °C)  98 °F (36.7 °C) 98.1 °F (36.7 °C) SpO2: 100%  98% 99% Weight:  104.3 kg (230 lb) Height:  5' 3\" (1.6 m) Lab Results Component Value Date/Time WBC 10.0 04/11/2019 05:17 PM  
 HGB 13.4 04/11/2019 05:17 PM  
 HCT 39.4 04/11/2019 05:17 PM  
 PLATELET 137 24/49/1785 05:17 PM  
 MCV 88.3 04/11/2019 05:17 PM  
 
Lab Results Component Value Date/Time Sodium 140 04/11/2019 05:17 PM  
 Potassium 4.0 04/11/2019 05:17 PM  
 Chloride 108 04/11/2019 05:17 PM  
 CO2 25 04/11/2019 05:17 PM  
 Anion gap 7 04/11/2019 05:17 PM  
 Glucose 95 04/11/2019 05:17 PM  
 BUN 12 04/11/2019 05:17 PM  
 Creatinine 0.90 04/11/2019 05:17 PM  
 BUN/Creatinine ratio 13 04/11/2019 05:17 PM  
 GFR est AA >60 04/11/2019 05:17 PM  
 GFR est non-AA >60 04/11/2019 05:17 PM  
 Calcium 8.8 04/11/2019 05:17 PM  
 Bilirubin, total 0.2 04/11/2019 05:17 PM  
 AST (SGOT) 19 04/11/2019 05:17 PM  
 Alk. phosphatase 107 04/11/2019 05:17 PM  
 Protein, total 7.2 04/11/2019 05:17 PM  
 Albumin 3.4 (L) 04/11/2019 05:17 PM  
 Globulin 3.8 04/11/2019 05:17 PM  
 A-G Ratio 0.9 (L) 04/11/2019 05:17 PM  
 ALT (SGPT) 23 04/11/2019 05:17 PM  
 
 
PAST PSYCHIATRIC HISTORY:  The patient denies any prior history of psychiatric treatment, inpatient hospitalizations or suicide attempts. She denies any prior history of substance abuse. Drinks one to two drinks a month and denies use of any recreational substances. PSYCHOSOCIAL HISTORY:  The patient currently lives in [place] with her . They have been  for almost 7 years. She has three children including a 10year-old, a 3year-old, and a 3year-old. 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 Denies any major legal or financial stressors. MENTAL STATUS EXAM:  The patient is a young  female who is dressed in casual street clothes. She is calm and cooperative. Makes good eye contact. Her affect is reactive and mood is reported as being better. Speech is spontaneous and coherent. Denies any active suicidal ideation or plan. Denies any perceptual abnormalities. Denies any delusions. Her thought process is logical and goal-directed. Cognitively, she is awake and alert, oriented to time, place, and person. Intelligence is average. Memory is intact and fund of knowledge is adequate. Insight is partial.  Judgment is fair. ASSESSMENT AND PLAN/DIAGNOSIS:  Major depressive disorder, moderate without psychotic symptoms. I will continue her inpatient stay. She will be provided with support and attend groups. Estimated length of stay is 5-7 days. Her strengths include her ability to seek help and support from her . DARELL VASQUEZ MD 
 
 
[id number] D:  04/12/2019 12:35 
T:  04/12/2019 12:40 JOB #:  [id number]

## 2020-10-01 ENCOUNTER — APPOINTMENT (OUTPATIENT)
Dept: ULTRASOUND IMAGING | Age: 30
End: 2020-10-01
Attending: EMERGENCY MEDICINE

## 2020-10-01 ENCOUNTER — HOSPITAL ENCOUNTER (EMERGENCY)
Age: 30
Discharge: HOME | End: 2020-10-01
Attending: STUDENT IN AN ORGANIZED HEALTH CARE EDUCATION/TRAINING PROGRAM

## 2020-10-01 ENCOUNTER — APPOINTMENT (OUTPATIENT)
Dept: CT IMAGING | Age: 30
End: 2020-10-01
Attending: EMERGENCY MEDICINE

## 2020-10-01 VITALS
WEIGHT: 239.2 LBS | DIASTOLIC BLOOD PRESSURE: 60 MMHG | OXYGEN SATURATION: 99 % | HEART RATE: 108 BPM | SYSTOLIC BLOOD PRESSURE: 113 MMHG | HEIGHT: 63 IN | TEMPERATURE: 98.6 F | BODY MASS INDEX: 42.38 KG/M2 | RESPIRATION RATE: 18 BRPM

## 2020-10-01 DIAGNOSIS — D48.1 DESMOID TUMOR: Primary | ICD-10-CM

## 2020-10-01 LAB
ALBUMIN SERPL-MCNC: 3.8 G/DL (ref 3.5–5)
ALBUMIN/GLOB SERPL: 0.9 {RATIO} (ref 1.1–2.2)
ALP SERPL-CCNC: 124 U/L (ref 45–117)
ALT SERPL-CCNC: 28 U/L (ref 12–78)
ANION GAP SERPL CALC-SCNC: 8 MMOL/L (ref 5–15)
AST SERPL-CCNC: 20 U/L (ref 15–37)
BASOPHILS # BLD: 0.1 K/UL (ref 0–0.1)
BASOPHILS NFR BLD: 1 % (ref 0–1)
BILIRUB SERPL-MCNC: 0.3 MG/DL (ref 0.2–1)
BUN SERPL-MCNC: 15 MG/DL (ref 6–20)
BUN/CREAT SERPL: 16 (ref 12–20)
CALCIUM SERPL-MCNC: 9.1 MG/DL (ref 8.5–10.1)
CHLORIDE SERPL-SCNC: 103 MMOL/L (ref 97–108)
CO2 SERPL-SCNC: 27 MMOL/L (ref 21–32)
COMMENT, HOLDF: NORMAL
CREAT SERPL-MCNC: 0.96 MG/DL (ref 0.55–1.02)
DIFFERENTIAL METHOD BLD: ABNORMAL
EOSINOPHIL # BLD: 0.3 K/UL (ref 0–0.4)
EOSINOPHIL NFR BLD: 3 % (ref 0–7)
ERYTHROCYTE [DISTWIDTH] IN BLOOD BY AUTOMATED COUNT: 12.9 % (ref 11.5–14.5)
GLOBULIN SER CALC-MCNC: 4.3 G/DL (ref 2–4)
GLUCOSE SERPL-MCNC: 89 MG/DL (ref 65–100)
HCG UR QL: NEGATIVE
HCT VFR BLD AUTO: 40.8 % (ref 35–47)
HGB BLD-MCNC: 13.5 G/DL (ref 11.5–16)
IMM GRANULOCYTES # BLD AUTO: 0.1 K/UL (ref 0–0.04)
IMM GRANULOCYTES NFR BLD AUTO: 0 % (ref 0–0.5)
LACTATE SERPL-SCNC: 1.2 MMOL/L (ref 0.4–2)
LYMPHOCYTES # BLD: 3.3 K/UL (ref 0.8–3.5)
LYMPHOCYTES NFR BLD: 26 % (ref 12–49)
MCH RBC QN AUTO: 28.4 PG (ref 26–34)
MCHC RBC AUTO-ENTMCNC: 33.1 G/DL (ref 30–36.5)
MCV RBC AUTO: 85.7 FL (ref 80–99)
MONOCYTES # BLD: 0.9 K/UL (ref 0–1)
MONOCYTES NFR BLD: 7 % (ref 5–13)
NEUTS SEG # BLD: 8.2 K/UL (ref 1.8–8)
NEUTS SEG NFR BLD: 63 % (ref 32–75)
NRBC # BLD: 0 K/UL (ref 0–0.01)
NRBC BLD-RTO: 0 PER 100 WBC
PLATELET # BLD AUTO: 282 K/UL (ref 150–400)
PMV BLD AUTO: 12.2 FL (ref 8.9–12.9)
POTASSIUM SERPL-SCNC: 4 MMOL/L (ref 3.5–5.1)
PROT SERPL-MCNC: 8.1 G/DL (ref 6.4–8.2)
RBC # BLD AUTO: 4.76 M/UL (ref 3.8–5.2)
SAMPLES BEING HELD,HOLD: NORMAL
SODIUM SERPL-SCNC: 138 MMOL/L (ref 136–145)
WBC # BLD AUTO: 12.9 K/UL (ref 3.6–11)

## 2020-10-01 PROCEDURE — 36415 COLL VENOUS BLD VENIPUNCTURE: CPT

## 2020-10-01 PROCEDURE — 76705 ECHO EXAM OF ABDOMEN: CPT

## 2020-10-01 PROCEDURE — 81025 URINE PREGNANCY TEST: CPT

## 2020-10-01 PROCEDURE — 99284 EMERGENCY DEPT VISIT MOD MDM: CPT

## 2020-10-01 PROCEDURE — 96374 THER/PROPH/DIAG INJ IV PUSH: CPT

## 2020-10-01 PROCEDURE — 74011000636 HC RX REV CODE- 636: Performed by: EMERGENCY MEDICINE

## 2020-10-01 PROCEDURE — 74011000258 HC RX REV CODE- 258: Performed by: EMERGENCY MEDICINE

## 2020-10-01 PROCEDURE — 85025 COMPLETE CBC W/AUTO DIFF WBC: CPT

## 2020-10-01 PROCEDURE — 80053 COMPREHEN METABOLIC PANEL: CPT

## 2020-10-01 PROCEDURE — 74011250636 HC RX REV CODE- 250/636: Performed by: EMERGENCY MEDICINE

## 2020-10-01 PROCEDURE — 83605 ASSAY OF LACTIC ACID: CPT

## 2020-10-01 PROCEDURE — 74177 CT ABD & PELVIS W/CONTRAST: CPT

## 2020-10-01 RX ORDER — SODIUM CHLORIDE 9 MG/ML
50 INJECTION, SOLUTION INTRAVENOUS
Status: COMPLETED | OUTPATIENT
Start: 2020-10-01 | End: 2020-10-01

## 2020-10-01 RX ORDER — SODIUM CHLORIDE 9 MG/ML
10 INJECTION INTRAMUSCULAR; INTRAVENOUS; SUBCUTANEOUS ONCE
Status: COMPLETED | OUTPATIENT
Start: 2020-10-01 | End: 2020-10-01

## 2020-10-01 RX ORDER — MORPHINE SULFATE 2 MG/ML
4 INJECTION, SOLUTION INTRAMUSCULAR; INTRAVENOUS
Status: DISCONTINUED | OUTPATIENT
Start: 2020-10-01 | End: 2020-10-01 | Stop reason: HOSPADM

## 2020-10-01 RX ADMIN — IOPAMIDOL 100 ML: 755 INJECTION, SOLUTION INTRAVENOUS at 18:50

## 2020-10-01 RX ADMIN — SODIUM CHLORIDE 1000 ML: 900 INJECTION, SOLUTION INTRAVENOUS at 17:56

## 2020-10-01 RX ADMIN — SODIUM CHLORIDE 10 ML: 9 INJECTION INTRAMUSCULAR; INTRAVENOUS; SUBCUTANEOUS at 18:50

## 2020-10-01 RX ADMIN — SODIUM CHLORIDE 50 ML: 900 INJECTION, SOLUTION INTRAVENOUS at 18:50

## 2020-10-01 NOTE — ED PROVIDER NOTES
82-year-old female no pertinent past medical history presenting to the ED for evaluation of a painful mass in her left groin. Patient reports that a few weeks ago she noticed a small lump in her left inguinal region within her pannus. She was keeping an eye on it with her OB/GYN. Over the last few days, she feels it has increased in size and become much more painful. She denies any redness or swelling. She denies any drainage of fluid. She reports no vaginal discharge or bleeding. She denies any fevers, chills, unintentional weight loss. She reports normal energy level. She denies pregnancy. History reviewed. No pertinent past medical history. Past Surgical History:   Procedure Laterality Date    HX  SECTION  2017         History reviewed. No pertinent family history.     Social History     Socioeconomic History    Marital status:      Spouse name: Not on file    Number of children: Not on file    Years of education: Not on file    Highest education level: Not on file   Occupational History    Not on file   Social Needs    Financial resource strain: Not on file    Food insecurity     Worry: Not on file     Inability: Not on file    Transportation needs     Medical: Not on file     Non-medical: Not on file   Tobacco Use    Smoking status: Never Smoker    Smokeless tobacco: Never Used   Substance and Sexual Activity    Alcohol use: Yes     Frequency: Never     Comment: Socially    Drug use: Never    Sexual activity: Not on file   Lifestyle    Physical activity     Days per week: Not on file     Minutes per session: Not on file    Stress: Not on file   Relationships    Social connections     Talks on phone: Not on file     Gets together: Not on file     Attends Hinduism service: Not on file     Active member of club or organization: Not on file     Attends meetings of clubs or organizations: Not on file     Relationship status: Not on file    Intimate partner violence     Fear of current or ex partner: Not on file     Emotionally abused: Not on file     Physically abused: Not on file     Forced sexual activity: Not on file   Other Topics Concern    Not on file   Social History Narrative    Not on file         ALLERGIES: Patient has no known allergies. Review of Systems   Constitutional: Negative for chills and fever. HENT: Negative for congestion. Eyes: Negative for visual disturbance. Respiratory: Negative for shortness of breath. Cardiovascular: Negative for chest pain. Gastrointestinal: Negative for abdominal pain, nausea and vomiting. Genitourinary: Negative for dysuria and hematuria. Musculoskeletal: Negative for back pain. Skin: Negative for rash. Allergic/Immunologic: Negative for immunocompromised state. Neurological: Negative for syncope, weakness and headaches. Psychiatric/Behavioral: Negative for confusion. Vitals:    10/01/20 1600   BP: 124/72   Pulse: (!) 108   Resp: 18   Temp: 98.6 °F (37 °C)   SpO2: 98%   Weight: 108.5 kg (239 lb 3.2 oz)   Height: 5' 3\" (1.6 m)            Physical Exam  Vitals signs and nursing note reviewed. Constitutional:       General: She is not in acute distress. Appearance: She is well-developed. She is obese. HENT:      Head: Normocephalic and atraumatic. Eyes:      General: No scleral icterus. Neck:      Trachea: No tracheal deviation. Cardiovascular:      Rate and Rhythm: Normal rate and regular rhythm. Heart sounds: Normal heart sounds. Pulmonary:      Effort: Pulmonary effort is normal. No respiratory distress. Breath sounds: Normal breath sounds. Abdominal:      General: There is no distension. Palpations: Abdomen is soft. Tenderness: There is no abdominal tenderness. Musculoskeletal: Normal range of motion. Skin:     General: Skin is warm and dry. Neurological:      Mental Status: She is alert and oriented to person, place, and time. Cranial Nerves: No cranial nerve deficit. MDM  Number of Diagnoses or Management Options  Desmoid tumor:   Diagnosis management comments: 65-year-old female presenting for evaluation of a mass in her left groin. Physical exam is notable for a poorly defined tender mass in her left inguinal region/pannus. No overlying erythema, effusion or felt suggest abscess. No history of malignancy that would make me concerned for metastatic disease or lymphoma. Will obtain ultrasound of the soft tissue in this area to evaluate for possible cyst versus deeper abscess. Amount and/or Complexity of Data Reviewed  Tests in the radiology section of CPT®: ordered           Procedures        Ultrasound notable for possible inguinal hernia. A CT scan was ordered to better evaluate. On CT, the mass appears to be a dermoid cyst.  I explained to the patient that this is a benign mass, which can only be proven as dermoid and benign by biopsy. The patient does not have a primary care doctor however will set up care with [facility] primary care to ensure he follows up for biopsy and likely resection. Both she and her  voiced understanding of the importance of follow-up for this mass.     Signed By: Saurabh Enriquez MD     October 2, 2020

## 2020-10-01 NOTE — ED TRIAGE NOTES
Triage: pt c/o intermittent left groin pain starting several months ago but worsening this past week. Pt was seen by OBGYN in August and referred to PCP because they didn't find anything. Pt has not since f/u with PCP. Denies vaginal discharge/bleeding, urinary symptoms, V/D, fever. +nausea with pain.

## 2020-10-05 ENCOUNTER — OFFICE VISIT (OUTPATIENT)
Dept: PRIMARY CARE CLINIC | Age: 30
End: 2020-10-05
Payer: MEDICAID

## 2020-10-05 ENCOUNTER — TELEPHONE (OUTPATIENT)
Dept: SURGERY | Age: 30
End: 2020-10-05

## 2020-10-05 VITALS
WEIGHT: 241 LBS | DIASTOLIC BLOOD PRESSURE: 80 MMHG | BODY MASS INDEX: 42.7 KG/M2 | SYSTOLIC BLOOD PRESSURE: 124 MMHG | HEIGHT: 63 IN | TEMPERATURE: 98.2 F | HEART RATE: 64 BPM | RESPIRATION RATE: 15 BRPM | OXYGEN SATURATION: 100 %

## 2020-10-05 DIAGNOSIS — R10.32 GROIN PAIN, LEFT: ICD-10-CM

## 2020-10-05 DIAGNOSIS — D48.1 DESMOID TUMOR: Primary | ICD-10-CM

## 2020-10-05 DIAGNOSIS — Z09 HOSPITAL DISCHARGE FOLLOW-UP: ICD-10-CM

## 2020-10-05 PROBLEM — F32.9 MAJOR DEPRESSIVE DISORDER: Status: RESOLVED | Noted: 2019-04-11 | Resolved: 2020-10-05

## 2020-10-05 PROCEDURE — 99203 OFFICE O/P NEW LOW 30 MIN: CPT | Performed by: FAMILY MEDICINE

## 2020-10-05 NOTE — PROGRESS NOTES
Subjective:     Chief Complaint   Patient presents with    New Patient     establish PCP    ED Follow-up     [facility] on 10/1/2020 for left groin pain. Had CT and ultrasound done, found possible desmoid tumor. She  is a 31y.o. year old female who presents as a new patient to establish and for follow up from her recent ER visit on 10/1/2020. Patient reports that she went to [facility] with a c/o pain in her left groin where USG showed possible hernia and later CT showed possible Desmoid tumor. She reports that her ob was following on the swelling since August.  Labs were fine. She denies any cough, night sweats, weight loss, N/V, diarrhea, constipation. She reports that she notice a small tender small lump in her left breast recently evaluated by her Ob. I reviewed ER records. A comprehensive review of systems was negative except for that written in the HPI.   Objective:     Vitals:    10/05/20 1417   BP: 124/80   Pulse: 64   Resp: 15   Temp: 98.2 °F (36.8 °C)   TempSrc: Oral   SpO2: 100%   Weight: 241 lb (109.3 kg)   Height: 5' 3\" (1.6 m)       Physical Examination: General appearance - alert, well appearing, and in no distress, oriented to person, place, and time and overweight  Mental status - alert, oriented to person, place, and time, normal mood, behavior, speech, dress, motor activity, and thought processes  Eyes - pupils equal and reactive, extraocular eye movements intact  Ears - bilateral TM's and external ear canals normal  Nose - normal and patent, no erythema, discharge or polyps  Mouth - mucous membranes moist, pharynx normal without lesions  Neck - supple, no significant adenopathy, thyroid exam: thyroid is normal in size without nodules or tenderness  Lymphatics - no palpable lymphadenopathy, no hepatosplenomegaly  Chest - clear to auscultation, no wheezes, rales or rhonchi, symmetric air entry  Heart - normal rate, regular rhythm, normal S1, S2, no murmurs, rubs, clicks or gallops  Abdomen - tenderness noted over the LLQ. Noticed small elongated tender mass over LLQ. No mass or swelling noted in axilla. Neurological - alert, oriented, normal speech, no focal findings or movement disorder noted  Musculoskeletal - no joint tenderness, deformity or swelling  Extremities - no pedal edema noted. No Known Allergies   Social History     Socioeconomic History    Marital status:      Spouse name: Not on file    Number of children: Not on file    Years of education: Not on file    Highest education level: Not on file   Tobacco Use    Smoking status: Never Smoker    Smokeless tobacco: Never Used   Substance and Sexual Activity    Alcohol use: Yes     Frequency: Never     Comment: Socially    Drug use: Never    Sexual activity: Yes     Partners: Male     Birth control/protection: None      Family History   Problem Relation Age of Onset    No Known Problems Mother     No Known Problems Father       Past Surgical History:   Procedure Laterality Date    HX  SECTION  2017     x 1    HX TONSILLECTOMY        History reviewed. No pertinent past medical history. Assessment/ Plan:   Diagnoses and all orders for this visit:    1. Desmoid tumor  -   Discussed about the tumor. Advised that only way to find the confirmation by biopsy. REFERRAL TO GENERAL SURGERY    2. Groin pain, left  -     REFERRAL TO GENERAL SURGERY    3. Hospital discharge follow-up      Same as #1. Medication risks/benefits/costs/interactions/alternatives discussed with patient. Advised patient to call back or return to office if symptoms worsen/change/persist. If patient cannot reach us or should anything more severe/urgent arise he/she should proceed directly to the nearest emergency department. Discussed expected course/resolution/complications of diagnosis in detail with patient.   Patient given a written after visit summary which includes her diagnoses, current medications and vitals. Patient expressed understanding with the diagnosis and plan. Follow-up and Dispositions    · Return if symptoms worsen or fail to improve, for complete physical  and fasting blood work. Joan Goldman

## 2020-10-05 NOTE — PROGRESS NOTES
1. Have you been to the ER, urgent care clinic since your last visit? Hospitalized since your last visit? Yes, see below. 2. Have you seen or consulted any other health care providers outside of the [address] [place] [address] since your last visit? Include any pap smears or colon screening. OBGYN- [facility] in [place], last office visit 8/13/2020. Chief Complaint   Patient presents with    New Patient     establish PCP    ED Follow-up     [facility] on 10/1/2020 for left groin pain. Had CT and ultrasound done, found possible desmoid tumor. Not fasting    Health Maintenance Due   Topic Date Due    PAP AKA CERVICAL CYTOLOGY  08/27/2013    Flu Vaccine (1) 09/01/2020     Flu vaccine: refused.

## 2020-10-13 ENCOUNTER — OFFICE VISIT (OUTPATIENT)
Dept: SURGERY | Age: 30
End: 2020-10-13
Payer: SELF-PAY

## 2020-10-13 VITALS
HEIGHT: 63 IN | HEART RATE: 78 BPM | DIASTOLIC BLOOD PRESSURE: 82 MMHG | RESPIRATION RATE: 18 BRPM | OXYGEN SATURATION: 97 % | WEIGHT: 241 LBS | SYSTOLIC BLOOD PRESSURE: 127 MMHG | BODY MASS INDEX: 42.7 KG/M2 | TEMPERATURE: 98.6 F

## 2020-10-13 DIAGNOSIS — R19.09 LEFT GROIN MASS: Primary | ICD-10-CM

## 2020-10-13 PROBLEM — E66.01 CLASS 3 SEVERE OBESITY IN ADULT (HCC): Status: ACTIVE | Noted: 2020-10-13

## 2020-10-13 PROCEDURE — 99203 OFFICE O/P NEW LOW 30 MIN: CPT | Performed by: SURGERY

## 2020-10-13 NOTE — PROGRESS NOTES
Maria Teresa Ledezma is a 31 y.o. female who is referred by Dr. Sean Gamble for further evaluation of a mass in his left groin. Information obtained from patient and review of chart. Ms. Kellie Chaudhari tells me that she was recently seen in the ER for evaluation of increasing pain and an associated mass in her left groin. No associated drainage or bleeding. No fevers or chills. Since then, her pain has improved. Furthermore, Ms. Kellie Chaudhari no longer appreciates the mass in her left groin. She has otherwise been in her usual state of health. Abdominal ultrasound - 10/1/2020 - Hypoechoic, irregular areas in the area of clinical concern which could represent hernia. Other processes such as abscess is considered less likely. CT scan abdomen/pelvis with IV contrast - 10/1/2020 - No evidence for hernia. Soft tissue mass in the left inguinal region, corresponding to the ultrasound finding. Considered desmoid tumor. Past Medical History:   Diagnosis Date    Class 3 severe obesity in adult [facility]) 10/13/2020    Left groin mass 10/13/2020     Past Surgical History:   Procedure Laterality Date    HX  SECTION  2017     x 1    HX TONSILLECTOMY       Family History   Problem Relation Age of Onset    No Known Problems Mother     No Known Problems Father      Social History     Socioeconomic History    Marital status:      Spouse name: Not on file    Number of children: Not on file    Years of education: Not on file    Highest education level: Not on file   Tobacco Use    Smoking status: Never Smoker    Smokeless tobacco: Never Used   Substance and Sexual Activity    Alcohol use: Yes     Frequency: Never     Comment: Socially    Drug use: Never    Sexual activity: Yes     Partners: Male     Birth control/protection: None     Review of systems negative except as noted. Review of Systems   Musculoskeletal:        Left groin pain improved. Physical Exam  Vitals signs reviewed.    Constitutional: General: She is not in acute distress. Appearance: Normal appearance. She is obese. HENT:      Head: Normocephalic and atraumatic. Eyes:      General: No scleral icterus. Neck:      Musculoskeletal: Neck supple. Cardiovascular:      Rate and Rhythm: Normal rate and regular rhythm. Pulmonary:      Effort: Pulmonary effort is normal.      Breath sounds: Normal breath sounds. Abdominal:      General: There is no distension. Palpations: Abdomen is soft. Tenderness: There is no abdominal tenderness. There is no guarding or rebound. Hernia: No hernia is present. There is no hernia in the left inguinal area. Comments: A discrete mass in the left groin is not appreciated. Musculoskeletal: Normal range of motion. Lymphadenopathy:      Cervical: No cervical adenopathy. Neurological:      General: No focal deficit present. Mental Status: She is alert. ASSESSMENT and PLAN  Reviewed imaging studies. In view of the findings on H and P and imaging studies, Ms. Gilford Shiver should benefit from biopsy of the left inguinal mass. Will arrange for image guided biopsy of the mass and will see her following that to review findings with her. Activity as tolerated for now. Follow up with Dr. Mini Tiwari as scheduled. She is agreeable to this plan and is most certainly free to contact the office should any questions or concerns arise.        CC: Frankie Martinez MD

## 2020-10-20 ENCOUNTER — HOSPITAL ENCOUNTER (OUTPATIENT)
Dept: ULTRASOUND IMAGING | Age: 30
Discharge: HOME | End: 2020-10-20
Attending: SURGERY

## 2020-10-20 DIAGNOSIS — R19.09 LEFT GROIN MASS: ICD-10-CM

## 2020-10-20 PROCEDURE — 88305 TISSUE EXAM BY PATHOLOGIST: CPT

## 2020-10-20 PROCEDURE — 74011000250 HC RX REV CODE- 250: Performed by: RADIOLOGY

## 2020-10-20 PROCEDURE — 49180 BIOPSY ABDOMINAL MASS: CPT

## 2020-10-20 RX ORDER — LIDOCAINE HYDROCHLORIDE 10 MG/ML
8 INJECTION INFILTRATION; PERINEURAL
Status: COMPLETED | OUTPATIENT
Start: 2020-10-20 | End: 2020-10-20

## 2020-10-20 RX ADMIN — LIDOCAINE HYDROCHLORIDE 8 ML: 10 INJECTION, SOLUTION INFILTRATION; PERINEURAL at 16:00

## 2020-11-06 ENCOUNTER — TELEPHONE (OUTPATIENT)
Dept: SURGERY | Age: 30
End: 2020-11-06

## 2020-11-06 NOTE — TELEPHONE ENCOUNTER
Called pt to re schedule  From Mon 11/09 due to Dr Nikita Medina not being available. Not able to leave message, VM is full.

## 2020-11-09 ENCOUNTER — OFFICE VISIT (OUTPATIENT)
Dept: SURGERY | Age: 30
End: 2020-11-09

## 2020-11-09 VITALS
BODY MASS INDEX: 42.7 KG/M2 | RESPIRATION RATE: 16 BRPM | SYSTOLIC BLOOD PRESSURE: 111 MMHG | OXYGEN SATURATION: 99 % | TEMPERATURE: 98.1 F | HEART RATE: 89 BPM | HEIGHT: 63 IN | DIASTOLIC BLOOD PRESSURE: 72 MMHG | WEIGHT: 241 LBS

## 2020-11-09 DIAGNOSIS — R19.09 LEFT GROIN MASS: Primary | ICD-10-CM

## 2020-11-09 PROCEDURE — 99212 OFFICE O/P EST SF 10 MIN: CPT | Performed by: SURGERY

## 2020-11-09 NOTE — PROGRESS NOTES
Patricia Arenas is a 31 y.o. female who returns following ultrasound guided biopsy of a mass in her left groin. Ms. Mariza Roldan was last seen on 2020 for evaluation of a mass in her left groin. She is s/p ultrasound guided biopsy of the mass on 2020. She has been doing well since then and has no new complaints today. She has otherwise been in her usual state of health. Pathology returned findings c/w endometriosis. Past Medical History:   Diagnosis Date    Class 3 severe obesity in adult [facility]) 10/13/2020    Left groin mass 10/13/2020     Past Surgical History:   Procedure Laterality Date    HX  SECTION  2017     x 1    HX TONSILLECTOMY       Family History   Problem Relation Age of Onset    No Known Problems Mother     No Known Problems Father      Social History     Socioeconomic History    Marital status:      Spouse name: Not on file    Number of children: Not on file    Years of education: Not on file    Highest education level: Not on file   Tobacco Use    Smoking status: Never Smoker    Smokeless tobacco: Never Used   Substance and Sexual Activity    Alcohol use: Yes     Frequency: Never     Comment: Socially    Drug use: Never    Sexual activity: Yes     Partners: Male     Birth control/protection: None     Review of systems negative except as noted. Review of Systems   Musculoskeletal:        Discomfort in left groin at site of mass which is worse with her menstrual periods. Physical Exam  Constitutional:       General: She is not in acute distress. Appearance: Normal appearance. She is obese. HENT:      Head: Normocephalic and atraumatic. Cardiovascular:      Rate and Rhythm: Normal rate and regular rhythm. Pulmonary:      Effort: Pulmonary effort is normal.      Breath sounds: Normal breath sounds. Abdominal:      General: There is no distension. Palpations: Abdomen is soft. Tenderness:  There is no abdominal tenderness. Musculoskeletal: Normal range of motion. Skin:     Comments: No significant change in left groin mass. Neurological:      General: No focal deficit present. Mental Status: She is alert. ASSESSMENT and PLAN  Discussed pathology results with Ms. Goran Arriaga today and reassured her that there was no evidence of malignancy in the specimen and that pathologically, the mass appears to be c/w an endometrioma. At this point in time, do not believe that there is an indication for surgical intervention. Asked Ms. Cisneros to follow up with her gynecologist to discuss management of endometriosis. Follow up with Dr. Joaquin Díaz as scheduled. Will see as needed. She is agreeable to this plan and is most certainly free to contact the office should any questions or concerns arise.        CC: Ryan Gutierrez MD

## 2020-11-09 NOTE — PROGRESS NOTES
1. Have you been to the ER, urgent care clinic since your last visit? Hospitalized since your last visit? No    2. Have you seen or consulted any other health care providers outside of the [address] [place] [address] since your last visit? Include any pap smears or colon screening.  No

## 2022-11-28 NOTE — TELEPHONE ENCOUNTER
Pt is coming  For a biopsy of a tumor next week wants  To know what to expect
Returned call to Ms Sandra Janusz verified all [facility]. I explained to patient the appointment with Dr Venancio Ling on 10/13/20 will be a consult. The MD will assess her and discuss the plan of care during that time. There will not be a BX done at that time.
monthly